# Patient Record
Sex: FEMALE | Race: WHITE | NOT HISPANIC OR LATINO | Employment: FULL TIME | URBAN - METROPOLITAN AREA
[De-identification: names, ages, dates, MRNs, and addresses within clinical notes are randomized per-mention and may not be internally consistent; named-entity substitution may affect disease eponyms.]

---

## 2024-07-17 ENCOUNTER — OFFICE VISIT (OUTPATIENT)
Dept: URGENT CARE | Facility: CLINIC | Age: 54
End: 2024-07-17
Payer: COMMERCIAL

## 2024-07-17 VITALS
DIASTOLIC BLOOD PRESSURE: 70 MMHG | HEART RATE: 80 BPM | HEIGHT: 65 IN | RESPIRATION RATE: 18 BRPM | BODY MASS INDEX: 35.32 KG/M2 | SYSTOLIC BLOOD PRESSURE: 108 MMHG | OXYGEN SATURATION: 96 % | TEMPERATURE: 99 F | WEIGHT: 212 LBS

## 2024-07-17 DIAGNOSIS — Z75.8 DOES NOT HAVE PRIMARY CARE PROVIDER: ICD-10-CM

## 2024-07-17 DIAGNOSIS — M79.674 PAIN OF GREAT TOE, RIGHT: Primary | ICD-10-CM

## 2024-07-17 PROCEDURE — 99213 OFFICE O/P EST LOW 20 MIN: CPT

## 2024-07-17 RX ORDER — LEVOTHYROXINE SODIUM 0.15 MG/1
150 TABLET ORAL DAILY
COMMUNITY
Start: 2024-05-20

## 2024-07-17 RX ORDER — NAPROXEN 500 MG/1
500 TABLET ORAL 2 TIMES DAILY WITH MEALS
Qty: 30 TABLET | Refills: 0 | Status: SHIPPED | OUTPATIENT
Start: 2024-07-17

## 2024-07-17 RX ORDER — DEXTROAMPHETAMINE SACCHARATE, AMPHETAMINE ASPARTATE, DEXTROAMPHETAMINE SULFATE AND AMPHETAMINE SULFATE 5; 5; 5; 5 MG/1; MG/1; MG/1; MG/1
20 TABLET ORAL 2 TIMES DAILY
COMMUNITY
Start: 2024-06-19 | End: 2024-07-19

## 2024-07-17 RX ORDER — SIMVASTATIN 20 MG
1 TABLET ORAL EVERY EVENING
COMMUNITY
Start: 2024-03-07

## 2024-07-17 RX ORDER — HALOBETASOL PROPIONATE 0.05 %
1 OINTMENT (GRAM) TOPICAL 2 TIMES DAILY
COMMUNITY
Start: 2024-05-01 | End: 2025-05-01

## 2024-07-17 RX ORDER — PROGESTERONE 200 MG/1
200 CAPSULE ORAL DAILY
COMMUNITY
Start: 2024-05-08 | End: 2025-05-08

## 2024-07-17 RX ORDER — LORAZEPAM 0.5 MG/1
0.5 TABLET ORAL
COMMUNITY
Start: 2024-06-19 | End: 2024-07-19

## 2024-07-17 RX ORDER — ESTRADIOL 0.05 MG/D
PATCH, EXTENDED RELEASE TRANSDERMAL
COMMUNITY
Start: 2024-04-16

## 2024-07-17 RX ORDER — VALACYCLOVIR HYDROCHLORIDE 500 MG/1
500 TABLET, FILM COATED ORAL 2 TIMES DAILY
COMMUNITY
Start: 2024-06-28

## 2024-07-17 NOTE — PATIENT INSTRUCTIONS
Use naproxen 500mg orally twice daily for 10-14 days.   Recommend low purine diet  Follow up with PCP in 3-5 days.  Proceed to ER if symptoms worse

## 2024-07-17 NOTE — PROGRESS NOTES
[facility]        NAME: Joselin Weiss is a 54 y.o. female  : 1970    MRN: [id number]  DATE: 2024  TIME: 9:32 PM    Assessment and Plan   Pain of great toe, right [M79.674]  1. Pain of great toe, right  naproxen (Naprosyn) 500 mg tablet      2. Does not have primary care provider  Ambulatory Referral to Family Practice            Patient Instructions     Use naproxen 500mg orally twice daily for 10-14 days.   Recommend low purine diet  Follow up with PCP in 3-5 days.  Proceed to  ER if symptoms worsen.    If tests are performed, our office will contact you with results only if changes need to made to the care plan discussed with you at the visit. [facility]    Chief Complaint     Chief Complaint   Patient presents with    Toe Pain     Reports on and off sharp pain on top of Right great toe, denies fever, denies known injury         History of Present Illness       Patient is a 55 yo female with no significant PMH presenting in the clinic today for right great toe pain x 2 weeks. Denies recent injuries, trauma, and/or falls. Patient locates their pain to the distal aspect of the right great toe. She describes her toe pain as intermittent sharp and stabbing pain. She states her pain is not exacerbated or alleviated by anything in particular. Denies fever, chills, numbness, tingling, swelling, bruising, erythema, warmth, chest pain, and SOB. Admits the use of ibuprofen for symptom management. Denies prior similar injuries. Denies h/o gout.          Review of Systems   Review of Systems   Constitutional:  Negative for chills and fever.   Respiratory:  Negative for shortness of breath.    Cardiovascular:  Negative for chest pain.   Musculoskeletal:  Positive for arthralgias. Negative for joint swelling.   Skin:  Negative for rash and wound.   Neurological:  Negative for numbness.         Current Medications       Current Outpatient  "Medications:     amphetamine-dextroamphetamine (ADDERALL) 20 mg tablet, Take 20 mg by mouth 2 (two) times a day, Disp: , Rfl:     estradiol (VIVELLE-DOT) 0.05 MG/24HR, PLACE 1 PATCH ON THE SKIN 2 TIMES A WEEK., Disp: , Rfl:     halobetasol (ULTRAVATE) 0.05 % ointment, Apply 1 application. topically 2 (two) times a day, Disp: , Rfl:     levothyroxine (Synthroid) 150 mcg tablet, Take 150 mcg by mouth daily, Disp: , Rfl:     LORazepam (ATIVAN) 0.5 mg tablet, Take 0.5 mg by mouth, Disp: , Rfl:     naproxen (Naprosyn) 500 mg tablet, Take 1 tablet (500 mg total) by mouth 2 (two) times a day with meals, Disp: 30 tablet, Rfl: 0    Progesterone 200 MG CAPS, Take 200 mg by mouth daily, Disp: , Rfl:     simvastatin (ZOCOR) 20 mg tablet, Take 1 tablet by mouth every evening, Disp: , Rfl:     valACYclovir (VALTREX) 500 mg tablet, Take 500 mg by mouth 2 (two) times a day, Disp: , Rfl:     Current Allergies     Allergies as of 2024 - never reviewed   Allergen Reaction Noted    Phentermine-topiramate Anxiety and Confusion 2022    Nitrous oxide Other (See Comments) 2021            The following portions of the patient's history were reviewed and updated as appropriate: allergies, current medications, past family history, past medical history, past social history, past surgical history and problem list.     Past Medical History:   Diagnosis Date    Hashimoto's disease     Hypothyroid        Past Surgical History:   Procedure Laterality Date     SECTION         No family history on file.      Medications have been verified.        Objective   /70 (BP Location: Right arm, Patient Position: Sitting)   Pulse 80   Temp 99 °F (37.2 °C) (Tympanic)   Resp 18   Ht 5' 5\" (1.651 m)   Wt 96.2 kg (212 lb)   LMP  (LMP Unknown) Comment: Curently on hormone replacement  SpO2 96%   BMI 35.28 kg/m²        Physical Exam     Physical Exam  Vitals reviewed.   Constitutional:       General: She is not in acute " distress.     Appearance: Normal appearance. She is normal weight. She is not ill-appearing.   HENT:      Head: Normocephalic.      Nose: Nose normal.      Mouth/Throat:      Mouth: Mucous membranes are moist.   Eyes:      Conjunctiva/sclera: Conjunctivae normal.   Cardiovascular:      Rate and Rhythm: Normal rate and regular rhythm.      Pulses: Normal pulses.      Heart sounds: Normal heart sounds. No murmur heard.     No friction rub. No gallop.   Pulmonary:      Effort: Pulmonary effort is normal.      Breath sounds: Normal breath sounds. No wheezing, rhonchi or rales.   Musculoskeletal:      Cervical back: Normal range of motion and neck supple.      Right ankle: Normal.      Left ankle: Normal.      Right foot: Normal range of motion. No swelling, deformity, tenderness or bony tenderness. Normal pulse.      Left foot: Normal.   Skin:     General: Skin is warm.      Findings: No rash.   Neurological:      Mental Status: She is alert.   Psychiatric:         Mood and Affect: Mood normal.         Behavior: Behavior normal.

## 2024-09-04 ENCOUNTER — TELEPHONE (OUTPATIENT)
Age: 54
End: 2024-09-04

## 2024-09-04 NOTE — TELEPHONE ENCOUNTER
Phone call from patient requesting office order labs prior to New Patient consult on 9/6/24 - advised patient labs are needed prior to appt, and will have to be ordered by PCP.  Patient to contact PCP - provided patient with lab close to home, and hours.

## 2024-09-05 ENCOUNTER — APPOINTMENT (OUTPATIENT)
Dept: LAB | Facility: HOSPITAL | Age: 54
End: 2024-09-05
Payer: COMMERCIAL

## 2024-09-05 DIAGNOSIS — E66.1 MORBID DRUG-INDUCED OBESITY: ICD-10-CM

## 2024-09-05 DIAGNOSIS — N95.9 MENOPAUSAL PROBLEM: ICD-10-CM

## 2024-09-05 DIAGNOSIS — N93.9 HEMORRHAGE IN UTERUS: ICD-10-CM

## 2024-09-05 DIAGNOSIS — E03.9 ACQUIRED HYPOTHYROIDISM: ICD-10-CM

## 2024-09-05 LAB
ALBUMIN SERPL BCG-MCNC: 4.5 G/DL (ref 3.5–5)
ALP SERPL-CCNC: 54 U/L (ref 34–104)
ALT SERPL W P-5'-P-CCNC: 28 U/L (ref 7–52)
ANION GAP SERPL CALCULATED.3IONS-SCNC: 7 MMOL/L (ref 4–13)
AST SERPL W P-5'-P-CCNC: 57 U/L (ref 13–39)
BILIRUB SERPL-MCNC: 0.48 MG/DL (ref 0.2–1)
BUN SERPL-MCNC: 25 MG/DL (ref 5–25)
CALCIUM SERPL-MCNC: 9.4 MG/DL (ref 8.4–10.2)
CHLORIDE SERPL-SCNC: 101 MMOL/L (ref 96–108)
CO2 SERPL-SCNC: 32 MMOL/L (ref 21–32)
CREAT SERPL-MCNC: 0.88 MG/DL (ref 0.6–1.3)
EST. AVERAGE GLUCOSE BLD GHB EST-MCNC: 117 MG/DL
ESTRADIOL SERPL-MCNC: 26.2 PG/ML
FSH SERPL-ACNC: 29.2 MIU/ML
GFR SERPL CREATININE-BSD FRML MDRD: 75 ML/MIN/1.73SQ M
GLUCOSE P FAST SERPL-MCNC: 97 MG/DL (ref 65–99)
HBA1C MFR BLD: 5.7 %
LH SERPL-ACNC: 13.2 MIU/ML
POTASSIUM SERPL-SCNC: 4.6 MMOL/L (ref 3.5–5.3)
PROGEST SERPL-MCNC: 21.7 NG/ML
PROT SERPL-MCNC: 7.5 G/DL (ref 6.4–8.4)
SODIUM SERPL-SCNC: 140 MMOL/L (ref 135–147)
T4 FREE SERPL-MCNC: 1 NG/DL (ref 0.61–1.12)
TESTOST SERPL-MSCNC: 77 NG/DL
TSH SERPL DL<=0.05 MIU/L-ACNC: 11.3 UIU/ML (ref 0.45–4.5)

## 2024-09-05 PROCEDURE — 83002 ASSAY OF GONADOTROPIN (LH): CPT

## 2024-09-05 PROCEDURE — 84403 ASSAY OF TOTAL TESTOSTERONE: CPT

## 2024-09-05 PROCEDURE — 36415 COLL VENOUS BLD VENIPUNCTURE: CPT

## 2024-09-05 PROCEDURE — 82672 ASSAY OF ESTROGEN: CPT

## 2024-09-05 PROCEDURE — 83001 ASSAY OF GONADOTROPIN (FSH): CPT

## 2024-09-05 PROCEDURE — 84443 ASSAY THYROID STIM HORMONE: CPT

## 2024-09-05 PROCEDURE — 83036 HEMOGLOBIN GLYCOSYLATED A1C: CPT

## 2024-09-05 PROCEDURE — 84439 ASSAY OF FREE THYROXINE: CPT

## 2024-09-05 PROCEDURE — 84144 ASSAY OF PROGESTERONE: CPT

## 2024-09-05 PROCEDURE — 82670 ASSAY OF TOTAL ESTRADIOL: CPT

## 2024-09-05 PROCEDURE — 80053 COMPREHEN METABOLIC PANEL: CPT

## 2024-09-06 ENCOUNTER — OFFICE VISIT (OUTPATIENT)
Dept: ENDOCRINOLOGY | Facility: HOSPITAL | Age: 54
End: 2024-09-06
Payer: COMMERCIAL

## 2024-09-06 VITALS
BODY MASS INDEX: 35.62 KG/M2 | HEIGHT: 65 IN | WEIGHT: 213.8 LBS | SYSTOLIC BLOOD PRESSURE: 118 MMHG | OXYGEN SATURATION: 97 % | HEART RATE: 83 BPM | DIASTOLIC BLOOD PRESSURE: 84 MMHG

## 2024-09-06 DIAGNOSIS — E66.9 CLASS 2 OBESITY WITHOUT SERIOUS COMORBIDITY WITH BODY MASS INDEX (BMI) OF 35.0 TO 35.9 IN ADULT, UNSPECIFIED OBESITY TYPE: ICD-10-CM

## 2024-09-06 DIAGNOSIS — E03.9 ACQUIRED HYPOTHYROIDISM: Primary | ICD-10-CM

## 2024-09-06 DIAGNOSIS — R73.03 PREDIABETES: ICD-10-CM

## 2024-09-06 PROBLEM — E66.812 CLASS 2 OBESITY WITH BODY MASS INDEX (BMI) OF 35.0 TO 35.9 IN ADULT: Status: ACTIVE | Noted: 2024-09-06

## 2024-09-06 PROCEDURE — 99204 OFFICE O/P NEW MOD 45 MIN: CPT | Performed by: INTERNAL MEDICINE

## 2024-09-06 RX ORDER — CALCIUM CITRATE/VITAMIN D3 200MG-6.25
TABLET ORAL
COMMUNITY

## 2024-09-06 RX ORDER — MULTIVITAMIN
1 CAPSULE ORAL DAILY
COMMUNITY
End: 2024-09-06 | Stop reason: ALTCHOICE

## 2024-09-06 RX ORDER — ESTRADIOL 0.07 MG/D
FILM, EXTENDED RELEASE TRANSDERMAL
COMMUNITY

## 2024-09-06 RX ORDER — LEVOTHYROXINE SODIUM 175 MCG
175 TABLET ORAL DAILY
Qty: 90 TABLET | Refills: 1 | Status: SHIPPED | OUTPATIENT
Start: 2024-09-06

## 2024-09-06 NOTE — PROGRESS NOTES
9/7/2024    Assessment & Plan      Diagnoses and all orders for this visit:    Acquired hypothyroidism  -     Testosterone, free, total  -     DHEA-sulfate  -     Insulin, fasting  -     Glucose, fasting  -     Thyroid Peroxidase and Thyroglobulin Antibodies  -     SALIVARY CORTISOL,THREE SPECIMENS; Future  -     Synthroid 175 MCG tablet; Take 1 tablet (175 mcg total) by mouth daily    Class 2 obesity without serious comorbidity with body mass index (BMI) of 35.0 to 35.9 in adult, unspecified obesity type  -     Testosterone, free, total  -     DHEA-sulfate  -     Insulin, fasting  -     Glucose, fasting  -     Thyroid Peroxidase and Thyroglobulin Antibodies  -     SALIVARY CORTISOL,THREE SPECIMENS; Future    Prediabetes  -     Testosterone, free, total  -     DHEA-sulfate  -     Insulin, fasting  -     Glucose, fasting  -     Thyroid Peroxidase and Thyroglobulin Antibodies  -     SALIVARY CORTISOL,THREE SPECIMENS; Future    Other orders  -     estradiol (VIVELLE-DOT) 0.075 MG/24HR; PLACE 1 PATCH ON THE SKIN 2 TIMES A WEEK.  -     Discontinue: Multiple Vitamin (multivitamin) capsule; Take 1 capsule by mouth daily  -     Multiple Vitamins-Minerals (Multivitamin Gummies Womens) CHEW; Chew 3 daily  -     MISC NATURAL PRODUCTS PO; Take by mouth Isagenix isaflush( for consipation) prn  -     COLLAGEN PO; Take by mouth Liquid prn        Assessment & Plan  1. Hypothyroidism.  Most recent thyroid function studies show that her TSH is markedly elevated with a normal free T4, signifying biochemical hypothyroidism. The use of estrogen replacement may have increased the liver metabolism of thyroid hormone, causing her to need increased thyroid hormone requirements. Proper administration of Synthroid was reviewed, and she will ensure a gap of 3 to 4 hours between her thyroid medicine and any supplements or vitamins. Synthroid will be increased to 175 mcg daily.     2. Prediabetes.  Her hemoglobin A1c of 5.7% is barely in the  prediabetes range. She is reassured about this and advised to continue working on dietary changes and exercise.    3. Obesity.  She has obesity, and secondary causes including elevated cortisol and insulin resistance will be ruled out. Thyroid antibodies will be checked to see if there is any underlying autoimmune disease. She will do midnight late-night saliva cortisol levels.     Fasting blood work will be drawn between 7 and 9 AM, consisting of glucose with fasting insulin level, DHEA-S, free and total testosterone level, thyroid peroxidase antibodies, and antithyroglobulin antibodies. She will also do three late-night saliva cortisol levels.    Follow-up will be determined based on the studies.      I have spent a total time of 60 minutes in caring for this patient on the day of the visit/encounter including Diagnostic results, Prognosis, Risks and benefits of tx options, Instructions for management, Patient and family education, Importance of tx compliance, Risk factor reductions, Impressions, Counseling / Coordination of care, Documenting in the medical record, Reviewing / ordering tests, medicine, procedures  , and Obtaining or reviewing history  .      CC: Thyroid consult    History of Present Illness    HPI: Joselin Weiss  is a 54-year-old female here for evaluation and consultation regarding her hypothyroidism.    She was diagnosed with hypothyroidism 21 years ago, following the birth of her first son. At that time, she also had high cholesterol, which was unusual for her. She has been on thyroid medication since then, with the dosage being adjusted over the years. Currently, she is taking Synthroid 150 mcg once daily in the morning, along with an over-the-counter multivitamin. She reports experiencing brain fog when her thyroid levels are unstable. She does not report any breast discharge or hot flashes. She has been dealing with constipation since her thyroid issues began and takes Isagenix to  manage it. She occasionally experiences heart palpitations, which have improved with hormone replacement therapy. She does not report any tremors or excessive fatigue but does report dry skin, particularly on her feet, and hair loss. She does not report any double vision or difficulty swallowing. She has not undergone radiation treatment to her head or neck.    She gained significant weight during her pregnancies, reaching a peak weight of 238 pounds. She managed to lose some weight, reaching a low of 190 pounds at her wedding, but has since regained some weight. She maintains a diet high in protein and low in carbohydrates and exercises 3-4 times a week. She has tried various weight management drugs, including Qsymia, phentermine, Topamax, and semaglutide, but found them either ineffective or associated with side effects such as anxiety and confusion.  Semaglutide was too expensive to continue and she only managed to lose 12 pounds.    She has been experiencing irregular menstrual cycles for the past four years, with periods of prolonged bleeding followed by periods of amenorrhea. She has been on hormone replacement therapy for over a year, which initially helped regulate her cycles, but she is now experiencing prolonged bleeding again. She also reports severe anxiety, panic attacks, and brain fog, which she attributes to her thyroid condition. She lost 40 pounds three years ago, but has since regained some weight. She does not report any fertility issues or use of oral contraceptives. She has been diagnosed with ovarian cysts. She tries to stay hydrated and urinates frequently. She does not report any numbness or tingling but does report occasional blurry vision, which she attributes to her eyes.    She has been diagnosed with prediabetes, which was confirmed by recent blood work. She has previously been diagnosed with prediabetes, but it resolved on its own.    She has been on Adderall for ADHD for about 5 to 6  years. She got  4 years ago, which worsened her condition. She started taking lorazepam after her brother passed away 4 years ago. She did not take it for a week and experienced memory issues. She feels depression coming on, but attributes it more to menopause. She feels like she is a mess and is more emotional.    FAMILY HISTORY  She denies any family history of thyroid disease or diabetes.        Historical Information   Past Medical History:   Diagnosis Date    ADHD     Anxiety     Hashimoto's disease     Hyperlipidemia     Hypothyroid     Prediabetes      Past Surgical History:   Procedure Laterality Date    ABDOMINOPLASTY       SECTION      X 2    WISDOM TOOTH EXTRACTION       Social History   Social History     Substance and Sexual Activity   Alcohol Use Yes    Alcohol/week: 4.0 standard drinks of alcohol    Types: 4 Glasses of wine per week    Comment: social     Social History     Substance and Sexual Activity   Drug Use Never     Social History     Tobacco Use   Smoking Status Never    Passive exposure: Never   Smokeless Tobacco Never     Family History:   Family History   Problem Relation Age of Onset    Seizures Mother     Dementia Mother     Depression Brother     Substance Abuse Brother     No Known Problems Son     No Known Problems Son        Meds/Allergies   Current Outpatient Medications   Medication Sig Dispense Refill    COLLAGEN PO Take by mouth Liquid prn      estradiol (VIVELLE-DOT) 0.075 MG/24HR PLACE 1 PATCH ON THE SKIN 2 TIMES A WEEK.      halobetasol (ULTRAVATE) 0.05 % ointment Apply 1 application. topically 2 (two) times a day      LORazepam (ATIVAN) 0.5 mg tablet Take 0.5 mg by mouth 1 in am prn, rarely 2nd in a day      MISC NATURAL PRODUCTS PO Take by mouth Isagenix isaflush( for consipation) prn      Multiple Vitamins-Minerals (Multivitamin Gummies Womens) CHEW Chew 3 daily      Progesterone 200 MG CAPS Take 200 mg by mouth daily      simvastatin (ZOCOR) 20 mg tablet  "Take 1 tablet by mouth every evening      Synthroid 175 MCG tablet Take 1 tablet (175 mcg total) by mouth daily 90 tablet 1    amphetamine-dextroamphetamine (ADDERALL) 20 mg tablet Take 20 mg by mouth 2 (two) times a day       No current facility-administered medications for this visit.     Allergies   Allergen Reactions    Phentermine-Topiramate Anxiety and Confusion    Nitrous Oxide Other (See Comments)     Post dental office, hallucinated, \"a bad trip,\" a couple weeks for each episode       Objective   Vitals: Blood pressure 118/84, pulse 83, height 5' 5\" (1.651 m), weight 97 kg (213 lb 12.8 oz), SpO2 97%.  Invasive Devices       None                   Physical Exam    HEENT: No lid lag, stare, proptosis, or periorbital edema.  No moon facies.  Neck: Thyroid normal in size.  Thyroid full.  No palpable thyroid nodules.  No lymphadenopathy.  No supraclavicular fat pad or buffalo hump.  Lungs: Clear to auscultation.  Coronary: Regular rate and rhythm.  No murmurs.  Extremities: No lower extremity edema.  No tremor of the outstretched hands.  No CVA tenderness.  No spinous process tenderness.  Neuro: Alert and oriented x 3.  Patellar deep tendon reflexes normal.  Skin: Warm and dry.  No violaceous striae.  No hirsutism on the chin, mid chest, or mid abdomen.      The history was obtained from the review of the chart and from the patient.    Lab Results:      Blood work performed on 9/5/2024 showed a TSH of 11.304 with a free T4 of 1.    Estradiol is 26.2 with an LH of 13.2 and an FSH of 29.2.  Progesterone is 21.7.    CMP showed a glucose of 97 fasting, sodium 140 with a potassium of 4.6 and an AST of 57 but was otherwise normal.    Hemoglobin A1c is 5.7%.    Total testosterone is 77 but no free testosterone was performed.        Lab Results   Component Value Date    CREATININE 0.88 09/05/2024    BUN 25 09/05/2024    K 4.6 09/05/2024     09/05/2024    CO2 32 09/05/2024     eGFR   Date Value Ref Range Status "   09/05/2024 75 ml/min/1.73sq m Final         Lab Results   Component Value Date    ALT 28 09/05/2024    AST 57 (H) 09/05/2024    ALKPHOS 54 09/05/2024       Lab Results   Component Value Date    FREET4 1.00 09/05/2024             Future Appointments   Date Time Provider Department Center   9/13/2024  1:30 PM SASHA Myers [facility]

## 2024-09-06 NOTE — PATIENT INSTRUCTIONS
The thyroid blood work is very underactive.    Let's try increasing the synthroid 175 mcg daily.     Make sure to move any vitamins or supplements to at least 3-4 hours away from when you take the synthroid.     I want to do fasting blood work.     We'll do 3 lat night before bedtime saliva cortisol levels.     Follow up to be determined.

## 2024-09-07 LAB — ESTROGEN SERPL-MCNC: 149 PG/ML

## 2024-09-09 ENCOUNTER — APPOINTMENT (OUTPATIENT)
Dept: LAB | Facility: HOSPITAL | Age: 54
End: 2024-09-09
Payer: COMMERCIAL

## 2024-09-09 ENCOUNTER — TELEPHONE (OUTPATIENT)
Dept: ENDOCRINOLOGY | Facility: HOSPITAL | Age: 54
End: 2024-09-09

## 2024-09-09 DIAGNOSIS — E66.9 CLASS 2 OBESITY WITHOUT SERIOUS COMORBIDITY WITH BODY MASS INDEX (BMI) OF 35.0 TO 35.9 IN ADULT, UNSPECIFIED OBESITY TYPE: ICD-10-CM

## 2024-09-09 DIAGNOSIS — E03.9 ACQUIRED HYPOTHYROIDISM: Primary | ICD-10-CM

## 2024-09-09 DIAGNOSIS — R73.03 PREDIABETES: ICD-10-CM

## 2024-09-09 LAB
GLUCOSE P FAST SERPL-MCNC: 99 MG/DL (ref 65–99)
INSULIN SERPL-ACNC: 7.17 UIU/ML (ref 1.9–23)

## 2024-09-09 PROCEDURE — 86376 MICROSOMAL ANTIBODY EACH: CPT

## 2024-09-09 PROCEDURE — 82627 DEHYDROEPIANDROSTERONE: CPT | Performed by: INTERNAL MEDICINE

## 2024-09-09 PROCEDURE — 82947 ASSAY GLUCOSE BLOOD QUANT: CPT | Performed by: INTERNAL MEDICINE

## 2024-09-09 PROCEDURE — 86800 THYROGLOBULIN ANTIBODY: CPT

## 2024-09-09 PROCEDURE — 84402 ASSAY OF FREE TESTOSTERONE: CPT | Performed by: INTERNAL MEDICINE

## 2024-09-09 PROCEDURE — 84403 ASSAY OF TOTAL TESTOSTERONE: CPT | Performed by: INTERNAL MEDICINE

## 2024-09-09 PROCEDURE — 36415 COLL VENOUS BLD VENIPUNCTURE: CPT | Performed by: INTERNAL MEDICINE

## 2024-09-09 PROCEDURE — 83525 ASSAY OF INSULIN: CPT | Performed by: INTERNAL MEDICINE

## 2024-09-09 NOTE — TELEPHONE ENCOUNTER
Patient said that she did get the lab slip for the cortisone test that she will do for the next two nights.    As far as the Synthroid, it needs a Prior Auth and needs verification codes to approve the medication.    The dosages and the amount dispensed has changed but the patient said to do what ever needs to be done to get approved. 30 or 90 day supply she doesn't care.    She also wanted to talk to the provider because her TSH levels were elevated and the last 3 weeks her mood and depression have been extremely severe. She doesn't recognize herself, her thoughts were very dark, and said it was just very bad. Today she is feeling a little better but she wants to speak with the provider to see what to do to help her.    Patient would like a call back as soon as possible.

## 2024-09-09 NOTE — TELEPHONE ENCOUNTER
I called and left a message concerning a patient message from her earlier on 9/7/2024.  The provider wanted me to ask to see if she got the lab slip for the saliva cortisol testing and also what needed to be done concerning her thyroid medication.

## 2024-09-10 ENCOUNTER — PATIENT MESSAGE (OUTPATIENT)
Dept: ENDOCRINOLOGY | Facility: HOSPITAL | Age: 54
End: 2024-09-10

## 2024-09-10 LAB
DHEA-S SERPL-MCNC: 112 UG/DL (ref 41.2–243.7)
TESTOST FREE SERPL-MCNC: 4.8 PG/ML (ref 0–4.2)
TESTOST SERPL-MCNC: 23 NG/DL (ref 4–50)
THYROGLOB AB SERPL-ACNC: <1 IU/ML (ref 0–0.9)
THYROPEROXIDASE AB SERPL-ACNC: 491 IU/ML (ref 0–34)

## 2024-09-10 NOTE — PATIENT COMMUNICATION
Patient called- she didn't hear back from Dr. Griffin regarding her labs. She said this is urgent and she would like a call back today because of how high her antibody level was.    Her call back number is [phone].

## 2024-09-11 ENCOUNTER — TELEPHONE (OUTPATIENT)
Age: 54
End: 2024-09-11

## 2024-09-11 NOTE — PATIENT COMMUNICATION
Patient called again about this, asks if provider can please review results.  She reports she feels more depressed and having brain fog.  Please review and call patient.    In regards to her Synthroid issues, she is not sure why the pharmacy is denying it.  Offered to call pharmacy for her.  Patient agreeable and would like a call back.  Called [facility], spoke with pharmacy staff.  Synthroid needs prior auth, will send message to PA team. Informed patient.

## 2024-09-11 NOTE — LETTER
ATTN: APPEALS DEPARTMENT     Patient: Joselin Weiss :1970 Member ID:[id number]    Re: Request for Reconsideration of SYNTHROID  175 MCG     To Whom It May Concern:   Joselin Weiss 1970 was denied coverage of SYNTHROID on 2024. The denial reason was stated as not covered due to PATIENT HAVING TO TRY LEVOTHYROXINE. I am requesting a redetermination of the denial of coverage for SYNTHROID due to PATIENT HAS TRIED AND COULD NOT BECOME THERAPEUTIC WITH THE LEVOTHYROXINE. PATIENT HAS BEEN GETTING THE MEDICATION EVERY MONTH AS IT IS.      In conclusion, please reconsider the denial of SYNTHROID for my patient. I would appreciate prompt review of this appeal and approval of this FDA-approved medication. I can be reached by phone at [phone]  or via fax at [phone] for additional information and discussion. Thank you.      Sincerely,   Janell Griffin MD

## 2024-09-13 ENCOUNTER — OFFICE VISIT (OUTPATIENT)
Dept: GYNECOLOGY | Facility: CLINIC | Age: 54
End: 2024-09-13
Payer: COMMERCIAL

## 2024-09-13 ENCOUNTER — APPOINTMENT (OUTPATIENT)
Dept: LAB | Facility: HOSPITAL | Age: 54
End: 2024-09-13
Payer: COMMERCIAL

## 2024-09-13 VITALS
BODY MASS INDEX: 35.32 KG/M2 | DIASTOLIC BLOOD PRESSURE: 86 MMHG | SYSTOLIC BLOOD PRESSURE: 138 MMHG | WEIGHT: 212 LBS | HEART RATE: 79 BPM | HEIGHT: 65 IN

## 2024-09-13 DIAGNOSIS — Z78.0 MENOPAUSE: Primary | ICD-10-CM

## 2024-09-13 DIAGNOSIS — R73.03 PREDIABETES: ICD-10-CM

## 2024-09-13 DIAGNOSIS — E66.9 CLASS 2 OBESITY WITHOUT SERIOUS COMORBIDITY WITH BODY MASS INDEX (BMI) OF 35.0 TO 35.9 IN ADULT, UNSPECIFIED OBESITY TYPE: ICD-10-CM

## 2024-09-13 DIAGNOSIS — E03.9 ACQUIRED HYPOTHYROIDISM: ICD-10-CM

## 2024-09-13 PROCEDURE — 82533 TOTAL CORTISOL: CPT

## 2024-09-13 PROCEDURE — 99204 OFFICE O/P NEW MOD 45 MIN: CPT | Performed by: OBSTETRICS & GYNECOLOGY

## 2024-09-13 NOTE — PROGRESS NOTES
Assessment/Plan:    Discussed with pt that it is my feeling that she still has ovarian function resulting in irregular bleeding.  I would like pt to stop hormone, ideally for 1 month, but at least 2 weeks and then recheck FSH/LH/estradiol. She has been advised to do this in the past by gyn, she stopped for 2 weeks but went back on because she felt awful. No labs drawn at that time. Lab script given and pt advised to notify the office if/when she stops hormone and has lab work drawn.   Discussed menopausal and premenopausal hormonal doses.   Also discussed thyroid impact on bleeding.     I have spent a total time of 47 minutes in caring for this patient on the day of the visit/encounter including Diagnostic results, Prognosis, Risks and benefits of tx options, Instructions for management, Patient and family education, Importance of tx compliance, Risk factor reductions, Impressions, Counseling / Coordination of care, Documenting in the medical record, Reviewing / ordering tests, medicine, procedures  , and Obtaining or reviewing history  .      Diagnoses and all orders for this visit:    Menopause  -     Luteinizing hormone; Future  -     Follicle stimulating hormone; Future  -     Estradiol; Future        Subjective:      Patient ID: Joselin Weiss is a 54 y.o. female.    New pt presents for menopause consult.  Pt states she is concerned with continued bleeding every week while on vivelle dot 0.075 mg and oral progesterone 200 mg daily. Denies missed progesterone dose. Pt started hormone 8/2023 and it is unclear whether she went 12 months without a period before starting.   FSH done in 2022 showed continued ovarian function. FSH/LH/estradiol done since then have been while pt was on hormone.   She had a D&C/hysteroscopy 2/2024 with benign endometrium.   She states she's had normal vaginal exams.   She has been advised to come off of hormone by gyn. Pt did go off for 2 weeks but restarted because she felt  "terrible. No blood work done at that time.   She is also working with endocrinology to manage thyroid fluctuations. She is awaiting increased synthroid dose of 175 mcgs to be approved by insurance. TSH on 9/5/24 11.3.        The following portions of the patient's history were reviewed and updated as appropriate: allergies, current medications, past family history, past medical history, past social history, past surgical history and problem list.    Review of Systems   Constitutional: Negative.    Respiratory: Negative.     Cardiovascular: Negative.    Gastrointestinal: Negative.    Endocrine: Negative.    Genitourinary:  Positive for vaginal bleeding. Negative for dyspareunia, dysuria, frequency, pelvic pain, urgency, vaginal discharge and vaginal pain.   Musculoskeletal: Negative.    Skin: Negative.    Neurological: Negative.    Psychiatric/Behavioral: Negative.           Objective:      /86   Pulse 79   Ht 5' 5\" (1.651 m)   Wt 96.2 kg (212 lb)   BMI 35.28 kg/m²          Physical Exam  Vitals and nursing note reviewed.   Constitutional:       Appearance: Normal appearance.   HENT:      Head: Normocephalic and atraumatic.   Pulmonary:      Effort: Pulmonary effort is normal.   Musculoskeletal:         General: Normal range of motion.      Cervical back: Normal range of motion.   Skin:     General: Skin is warm and dry.   Neurological:      Mental Status: She is alert and oriented to person, place, and time.   Psychiatric:         Mood and Affect: Mood normal.         Behavior: Behavior normal.         Thought Content: Thought content normal.         Judgment: Judgment normal.         "

## 2024-09-17 NOTE — TELEPHONE ENCOUNTER
PA for Synthroid 175 MCG tablet SUBMITTED     via    []CMM-KEY:    [x]Jessica-Case ID # [id number]  []Faxed to plan   []Other website    []Phone call Case ID #      Office notes sent, clinical questions answered. Awaiting determination    Turnaround time for your insurance to make a decision on your Prior Authorization can take 7-21 business days.

## 2024-09-18 NOTE — TELEPHONE ENCOUNTER
PA for Synthroid 175 MCG tablet  DENIED    Reason:      Message sent to office clinical pool No      Denial letter scanned into Media Yes    Appeal started Yes (Provider will need to decide if appeal is warranted and send clinical documentation to Prior Authorization Team for initiation.)    **Please follow up with your patient regarding denial and next steps**

## 2024-09-18 NOTE — TELEPHONE ENCOUNTER
PA for SYNTHROID 175 MCG APPEALED via     []CMM  []SS  [x]Letter sent to insurance via fax [phone]  []Other site or means      All necessary records sent. Will await response from insurance company    Turnaround time for a decision to be made on an appeal could take up to 30 business days

## 2024-09-19 LAB
CORTIS BS SAL-MCNC: <0.01 UG/DL
CORTIS SP1 P CHAL SAL-MCNC: 0.02 UG/DL
CORTIS SP2 P CHAL SAL-MCNC: 0.31 UG/DL

## 2025-01-13 ENCOUNTER — OFFICE VISIT (OUTPATIENT)
Dept: URGENT CARE | Facility: CLINIC | Age: 55
End: 2025-01-13
Payer: COMMERCIAL

## 2025-01-13 VITALS
TEMPERATURE: 99.5 F | DIASTOLIC BLOOD PRESSURE: 72 MMHG | SYSTOLIC BLOOD PRESSURE: 136 MMHG | OXYGEN SATURATION: 99 % | HEART RATE: 96 BPM | RESPIRATION RATE: 16 BRPM

## 2025-01-13 DIAGNOSIS — J02.9 ACUTE PHARYNGITIS, UNSPECIFIED ETIOLOGY: ICD-10-CM

## 2025-01-13 DIAGNOSIS — R52 BODY ACHES: ICD-10-CM

## 2025-01-13 DIAGNOSIS — J06.9 VIRAL URI WITH COUGH: Primary | ICD-10-CM

## 2025-01-13 PROCEDURE — 99213 OFFICE O/P EST LOW 20 MIN: CPT

## 2025-01-13 PROCEDURE — 87636 SARSCOV2 & INF A&B AMP PRB: CPT

## 2025-01-13 PROCEDURE — 87880 STREP A ASSAY W/OPTIC: CPT

## 2025-01-13 RX ORDER — METHYLPREDNISOLONE 4 MG/1
TABLET ORAL
Qty: 21 TABLET | Refills: 0 | Status: SHIPPED | OUTPATIENT
Start: 2025-01-13

## 2025-01-14 LAB
FLUAV RNA RESP QL NAA+PROBE: NEGATIVE
FLUBV RNA RESP QL NAA+PROBE: NEGATIVE
SARS-COV-2 RNA RESP QL NAA+PROBE: POSITIVE

## 2025-01-14 NOTE — PATIENT INSTRUCTIONS
Rapid strep test negative.  You have a Covid/Flu PCR test pending. You can download [employer] for the results which take approximately 24-48 hours. You will be notified if positive.      Your symptoms are consistent with a viral illness.    For nasal/sinus congestion you can try steam, warm compresses, saline nasal spray, Neti pot, nasal steroid (Flonase, Nasocort), or nasal decongestant (Afrin - for 3 days only).    You can try a decongestant (Sudafed) if > 6 years of age and no history of high blood pressure.    For cough you can take an over-the-counter expectorant such as plain Robitussion or Mucinex. A spoonful of honey at bedtime may also be helpful.    For cold symptoms with high blood pressure take Coricidin cough/cold.     For sore throat you can use Cepacol lozenges, do warm salt water gargles, drink warm water with lemon or herbal teas, or use an over-the-counter throat spray (Chloraseptic).    You can take ibuprofen/Motrin and acetaminophen/Tylenol as needed for pain, fever, body aches. Do not take ibuprofen/Motrin/Advil if you have a history of heart disease, bleeding ulcers, or if you take blood thinners.     Drink plenty of fluids to stay hydrated. Airborne or Emergen-C for extra vitamin C and zinc.    Follow up with your PCP in 3-5 days for persistent symptoms.    Go to the ER if symptoms worsen.

## 2025-01-14 NOTE — PROGRESS NOTES
[facility]        NAME: Joselin Weiss is a 54 y.o. female  : 1970    MRN: [id number]  DATE: 2025  TIME: 8:29 PM    Assessment and Plan   Viral URI with cough [J06.9]  1. Viral URI with cough  methylPREDNISolone 4 MG tablet therapy pack      2. Acute pharyngitis, unspecified etiology  POCT rapid ANTIGEN strepA      3. Body aches  Covid/Flu- Office Collect Normal            Patient Instructions     Rapid strep test negative.  You have a Covid/Flu PCR test pending. You can download [facility] MyChart for the results which take approximately 24-48 hours. You will be notified if positive.      Your symptoms are consistent with a viral illness.    For nasal/sinus congestion you can try steam, warm compresses, saline nasal spray, Neti pot, nasal steroid (Flonase, Nasocort), or nasal decongestant (Afrin - for 3 days only).    You can try a decongestant (Sudafed) if > 6 years of age and no history of high blood pressure.    For cough you can take an over-the-counter expectorant such as plain Robitussion or Mucinex. A spoonful of honey at bedtime may also be helpful.    For cold symptoms with high blood pressure take Coricidin cough/cold.     For sore throat you can use Cepacol lozenges, do warm salt water gargles, drink warm water with lemon or herbal teas, or use an over-the-counter throat spray (Chloraseptic).    You can take ibuprofen/Motrin and acetaminophen/Tylenol as needed for pain, fever, body aches. Do not take ibuprofen/Motrin/Advil if you have a history of heart disease, bleeding ulcers, or if you take blood thinners.     Drink plenty of fluids to stay hydrated. Airborne or Emergen-C for extra vitamin C and zinc.    Follow up with your PCP in 3-5 days for persistent symptoms.    Go to the ER if symptoms worsen.     If tests are performed, our office will contact you with results only if changes need to made to the care plan discussed with you at the visit. You can review your  full results on [facility].      Chief Complaint     Chief Complaint   Patient presents with    Nasal Congestion     Patient with sore throat, ear pain, cough, back pain, nausea, chills and fatigue x2 days. Patient states OTC meds have been minimally helpful.          History of Present Illness       54-year-old female presenting with cold-like symptoms x 2 days.  Patient reports fatigue, body aches, chills, ear discomfort, sore throat, congestion, cough, and nausea. No chest pain or shortness of breath. Taking day/night cold and flu medication.  Concerned as she is traveling out of the country [place] 1 week.        Review of Systems   Review of Systems   Constitutional:  Positive for chills and fatigue.   HENT:  Positive for congestion, ear pain and sore throat. Negative for ear discharge and sinus pressure.    Eyes:  Negative for discharge and redness.   Respiratory:  Positive for cough. Negative for shortness of breath and wheezing.    Cardiovascular:  Negative for chest pain and palpitations.   Gastrointestinal:  Positive for nausea. Negative for abdominal pain, diarrhea and vomiting.   Musculoskeletal:  Positive for back pain. Negative for joint swelling and neck pain.   Skin:  Negative for rash.   Neurological:  Negative for dizziness, light-headedness and headaches.         Current Medications       Current Outpatient Medications:     methylPREDNISolone 4 MG tablet therapy pack, Use as directed on package, Disp: 21 tablet, Rfl: 0    amphetamine-dextroamphetamine (ADDERALL) 20 mg tablet, Take 20 mg by mouth 2 (two) times a day, Disp: , Rfl:     COLLAGEN PO, Take by mouth Liquid prn, Disp: , Rfl:     estradiol (VIVELLE-DOT) 0.075 MG/24HR, PLACE 1 PATCH ON THE SKIN 2 TIMES A WEEK., Disp: , Rfl:     halobetasol (ULTRAVATE) 0.05 % ointment, Apply 1 application. topically 2 (two) times a day, Disp: , Rfl:     LORazepam (ATIVAN) 0.5 mg tablet, Take 0.5 mg by mouth 1 in am prn, rarely 2nd in a day, Disp: , Rfl:      MISC NATURAL PRODUCTS PO, Take by mouth Isagenix isaflush( for consipation) prn, Disp: , Rfl:     Multiple Vitamins-Minerals (Multivitamin Gummies Womens) CHEW, Chew 3 daily, Disp: , Rfl:     Progesterone 200 MG CAPS, Take 200 mg by mouth daily, Disp: , Rfl:     simvastatin (ZOCOR) 20 mg tablet, Take 1 tablet by mouth every evening, Disp: , Rfl:     Synthroid 175 MCG tablet, Take 1 tablet (175 mcg total) by mouth daily, Disp: 90 tablet, Rfl: 1    Current Allergies     Allergies as of 2025 - Reviewed 2025   Allergen Reaction Noted    Phentermine-topiramate Anxiety and Confusion 2022    Nitrous oxide Other (See Comments) 2021            The following portions of the patient's history were reviewed and updated as appropriate: allergies, current medications, past family history, past medical history, past social history, past surgical history and problem list.     Past Medical History:   Diagnosis Date    ADHD     Anxiety     Hashimoto's disease     Hyperlipidemia     Hypothyroid     Prediabetes        Past Surgical History:   Procedure Laterality Date    ABDOMINOPLASTY       SECTION      X 2    WISDOM TOOTH EXTRACTION         Family History   Problem Relation Age of Onset    Seizures Mother     Dementia Mother     Depression Brother     Substance Abuse Brother     No Known Problems Son     No Known Problems Son          Medications have been verified.        Objective   /72   Pulse 96   Temp 99.5 °F (37.5 °C)   Resp 16   SpO2 99%        Physical Exam     Physical Exam  Vitals and nursing note reviewed.   Constitutional:       General: She is not in acute distress.     Appearance: She is not ill-appearing or diaphoretic.   HENT:      Head: Normocephalic and atraumatic.      Right Ear: Tympanic membrane, ear canal and external ear normal.      Left Ear: Tympanic membrane, ear canal and external ear normal.      Nose: Nose normal.      Mouth/Throat:      Mouth: Mucous  membranes are moist.      Pharynx: Oropharynx is clear.   Eyes:      Conjunctiva/sclera: Conjunctivae normal.   Cardiovascular:      Rate and Rhythm: Normal rate and regular rhythm.   Pulmonary:      Effort: Pulmonary effort is normal.      Breath sounds: Normal breath sounds.   Musculoskeletal:         General: Normal range of motion.      Cervical back: Normal range of motion and neck supple.   Skin:     General: Skin is warm and dry.      Capillary Refill: Capillary refill takes less than 2 seconds.   Neurological:      Mental Status: She is alert and oriented to person, place, and time.

## 2025-04-04 ENCOUNTER — TELEPHONE (OUTPATIENT)
Dept: GYNECOLOGY | Facility: CLINIC | Age: 55
End: 2025-04-04

## 2025-04-04 DIAGNOSIS — Z78.0 MENOPAUSE: Primary | ICD-10-CM

## 2025-04-04 RX ORDER — PROGESTERONE 200 MG/1
200 CAPSULE ORAL DAILY
Qty: 30 CAPSULE | Refills: 0 | Status: SHIPPED | OUTPATIENT
Start: 2025-04-04 | End: 2026-04-04

## 2025-04-04 NOTE — TELEPHONE ENCOUNTER
----- Message from SASHA Myers sent at 4/4/2025  2:58 PM EDT -----  Regarding: needs menopause follow up visit  Pt never had 3 month menopause follow up visit. Please schedule. Needs to be in person.

## 2025-04-07 ENCOUNTER — TELEPHONE (OUTPATIENT)
Dept: GYNECOLOGY | Facility: CLINIC | Age: 55
End: 2025-04-07

## 2025-04-07 NOTE — TELEPHONE ENCOUNTER
Patient called requesting refill for Progesterone 200 MG CAPS . Patient made aware medication was refilled on 04/04/25 for 30 with 0 refills to [facility] pharmacy. Patient instructed to contact the pharmacy to obtain refills of medication. Patient verbalized understanding.

## 2025-05-03 DIAGNOSIS — Z78.0 MENOPAUSE: ICD-10-CM

## 2025-05-07 ENCOUNTER — TELEPHONE (OUTPATIENT)
Age: 55
End: 2025-05-07

## 2025-05-07 NOTE — TELEPHONE ENCOUNTER
I left pt another message to return call re: med refill. She was bleeding and was s/p D&C 2/2024. If she is still bleeding she will need to be seen in the office for tvs/sis/emb.   (It would not let me send it to [facility] [place], so I sent it to you two)

## 2025-05-07 NOTE — TELEPHONE ENCOUNTER
Spoke with pt to see if she is still bleeding on HRT medication. She was unclear with where she was at with her symptoms. Please call her when you are available. She is available to speak preferably in the morning

## 2025-05-12 DIAGNOSIS — Z78.0 MENOPAUSE: ICD-10-CM

## 2025-05-12 RX ORDER — PROGESTERONE 200 MG/1
200 CAPSULE ORAL DAILY
Qty: 30 CAPSULE | Refills: 0 | Status: SHIPPED | OUTPATIENT
Start: 2025-05-12 | End: 2026-05-12

## 2025-05-12 RX ORDER — ESTRADIOL 0.07 MG/D
1 FILM, EXTENDED RELEASE TRANSDERMAL 2 TIMES WEEKLY
Qty: 8 PATCH | Refills: 0 | Status: SHIPPED | OUTPATIENT
Start: 2025-05-12

## 2025-05-12 RX ORDER — PROGESTERONE 200 MG/1
1 CAPSULE ORAL DAILY
Qty: 30 CAPSULE | Refills: 0 | OUTPATIENT
Start: 2025-05-12

## 2025-05-12 NOTE — TELEPHONE ENCOUNTER
Pt scheduled. I let pt know if she is continuing to have bleeding prescription cannot be refilled at this time until she has necessary testing. Pt is scheduled for 5/28. She does not wish to be off HRT for the next two weeks. Pt wishes to speak with you regarding this

## 2025-05-12 NOTE — TELEPHONE ENCOUNTER
I left pt a message letting her know I sent in 1 month script for both estrogen patch and oral progesterone until her appt on 5/28/25. That will be the last script until bleeding is evaluated.

## 2025-05-21 NOTE — PROGRESS NOTES
AMB US Pelvic Non OB    Date/Time: 5/28/2025 2:30 PM    Performed by: Sherry Jimenez  Authorized by: Roe Garces DO    Universal Protocol:  Consent: Verbal consent obtained  Consent given by: patient  Timeout called at: 5/28/2025 2:36 PM.  Patient understanding: patient states understanding of the procedure being performed  Patient identity confirmed: verbally with patient    Procedure details:     SIS Procedure: Yes    Indications: non-obstetric vaginal bleeding      Technique:  Transvaginal US, Non-OB    Position: lithotomy exam    Uterine findings:     Length (cm): 7.64    Height (cm):  4.19    Width (cm):  6.05    Endometrial stripe: identified      Endometrium thickness (mm):  7.29  Left ovary findings:     Left ovary:  Visualized    Length (cm): 2.86    Height (cm): 1.49    Width (cm): 1.46  Right ovary findings:     Right ovary:  Visualized    Length (cm): 2.57    Height (cm): 1.38    Width (cm): 1.44  Other findings:     Free pelvic fluid: not identified      Free peritoneal fluid: not identified    Post-Procedure Details:     Impression:  Retroflexed uterus is inhomogeneous throughout without fibroids. The bilateral ovaries appear within normal limits. No free fluid.     Tolerance:  Tolerated well, no immediate complications    Complications: no complications    Additional Procedure Comments:      GE Voluson P8 transvaginal transducer RIC5-RA with Serial Number [id number] was used during procedure and subsequently cleaned with high level disinfection utilizing the [employer] EPR Probe .     Ultrasound performed at:     [facility]  [address] [place] [address]  Phone: [phone]  Fax:  [phone]      Sonohysterogram    Date/Time: 5/28/2025 2:30 PM    Performed by: Roe Garces DO  Authorized by: Roe Garces DO    Universal Protocol:  Consent: Verbal consent obtained  Consent given by: patient  Timeout called at:  5/28/2025 2:36 PM.  Patient understanding: patient states understanding of the procedure being performed  Patient consent: the patient's understanding of the procedure matches consent given  Patient identity confirmed: verbally with patient    Procedure:     Cervix cleaned and prepped: yes      Tenaculum applied to cervix: yes      Uterus sounded: yes      Catheter inserted: yes      Uterine cavity distended with saline: yes    Post-procedure:     Patient observed: yes      Post procedure instructions given to patient: yes      Patient tolerated procedure well with no complications: yes    Comments:      Sonohysterogram demonstrates a smooth appearing endometrium.   Endometrial biopsy    Date/Time: 5/28/2025 2:30 PM    Performed by: Roe Garces DO  Authorized by: Roe Garces DO    Universal Protocol:  Consent: Verbal consent obtained  Consent given by: patient  Timeout called at: 5/28/2025 2:36 PM.  Patient understanding: patient states understanding of the procedure being performed  Patient identity confirmed: verbally with patient    Indication:     Indications: Post-menopausal bleeding    Procedure:     Procedure: endometrial biopsy with Pipelle      A bivalve speculum was placed in the vagina: yes      Cervix cleaned and prepped: yes      Specimen collected: specimen collected and sent to pathology      Patient tolerated procedure well with no complications: yes

## 2025-05-28 ENCOUNTER — ULTRASOUND (OUTPATIENT)
Dept: GYNECOLOGY | Facility: CLINIC | Age: 55
End: 2025-05-28

## 2025-05-28 ENCOUNTER — OFFICE VISIT (OUTPATIENT)
Dept: GYNECOLOGY | Facility: CLINIC | Age: 55
End: 2025-05-28

## 2025-05-28 DIAGNOSIS — N95.0 PMB (POSTMENOPAUSAL BLEEDING): Primary | ICD-10-CM

## 2025-05-28 DIAGNOSIS — Z79.890 HORMONE REPLACEMENT THERAPY (HRT): ICD-10-CM

## 2025-05-28 DIAGNOSIS — E03.9 HYPOTHYROIDISM, UNSPECIFIED TYPE: ICD-10-CM

## 2025-05-28 PROCEDURE — 88305 TISSUE EXAM BY PATHOLOGIST: CPT | Performed by: STUDENT IN AN ORGANIZED HEALTH CARE EDUCATION/TRAINING PROGRAM

## 2025-05-28 NOTE — PROGRESS NOTES
:  Assessment & Plan  PMB (postmenopausal bleeding)  Viewed ultrasound findings with patient with biopsy results pending.  Scant tissue obtained on biopsy.  My recommendation was that she needs to get her thyroid levels appropriate and following that should she continue have irregular bleeding I would modify the dosage of HRT.       Hormone replacement therapy (HRT)         Hypothyroidism, unspecified type             History of Present Illness     Joselin Weiss is a 55 y.o. female   HPI patient last seen in the office in September 2020 for with [place] is a new patient for menopause consult.  Patient stated she is concerned with continued bleeding every week while on Vivelle-Dot 0.075 mg and oral progesterone 200 mg daily.  She denies missing any progesterone dosage.  Patient started on hormone replacement therapy in August 2023.  The patient has had a prior D&C hysteroscopy in February 2024 at [facility] with findings of atrophic endometrium with a benign endometrium.  She is also on thyroid supplement.  There is been in some issues with changing dosage patient has not had a follow-up TSH and she has not been on the dosage recommended.  She has been having some irregular bleeding and advised to return to the office for TVS possible SIS possible biopsy      Review of Systems  Objective   There were no vitals taken for this visit.     Physical Exam  Procedure Orders   AMB US Pelvic Non OB [[id number]] ordered by Sherry Jimenez   Sonohysterogram [[id number]] ordered by Sherry Jimenez   Endometrial biopsy [[id number]] ordered by Sherry Jimenez     Post-procedure Diagnoses   PMB (postmenopausal bleeding) [N95.0]            AMB US Pelvic Non OB     Date/Time: 5/28/2025 2:30 PM     Performed by: Sherry Jimenez  Authorized by: Roe Garces DO    Universal Protocol:  Consent: Verbal consent obtained  Consent given by: patient  Timeout called at: 5/28/2025 2:36 PM.  Patient understanding: patient  states understanding of the procedure being performed  Patient identity confirmed: verbally with patient     Procedure details:     SIS Procedure: Yes    Indications: non-obstetric vaginal bleeding      Technique:  Transvaginal US, Non-OB    Position: lithotomy exam    Uterine findings:     Length (cm): 7.64    Height (cm):  4.19    Width (cm):  6.05    Endometrial stripe: identified      Endometrium thickness (mm):  7.29  Left ovary findings:     Left ovary:  Visualized    Length (cm): 2.86    Height (cm): 1.49    Width (cm): 1.46  Right ovary findings:     Right ovary:  Visualized    Length (cm): 2.57    Height (cm): 1.38    Width (cm): 1.44  Other findings:     Free pelvic fluid: not identified      Free peritoneal fluid: not identified    Post-Procedure Details:     Impression:  Retroflexed uterus is inhomogeneous throughout without fibroids. The bilateral ovaries appear within normal limits. No free fluid.     Tolerance:  Tolerated well, no immediate complications    Complications: no complications    Additional Procedure Comments:      GE Voluson P8 transvaginal transducer RIC5-RA with Serial Number [id number] was used during procedure and subsequently cleaned with high level disinfection utilizing the [employer] EPR Probe .      Ultrasound performed at:      [facility]  [address] [place] [address]  Phone: [phone]  Fax:  [phone]        Sonohysterogram     Date/Time: 5/28/2025 2:30 PM     Performed by: Roe Garces DO  Authorized by: Roe Garces DO    Universal Protocol:  Consent: Verbal consent obtained  Consent given by: patient  Timeout called at: 5/28/2025 2:36 PM.  Patient understanding: patient states understanding of the procedure being performed  Patient consent: the patient's understanding of the procedure matches consent given  Patient identity confirmed: verbally with patient     Procedure:     Cervix cleaned  and prepped: yes      Tenaculum applied to cervix: yes      Uterus sounded: yes      Catheter inserted: yes      Uterine cavity distended with saline: yes    Post-procedure:     Patient observed: yes      Post procedure instructions given to patient: yes      Patient tolerated procedure well with no complications: yes    Comments:      Sonohysterogram demonstrates a smooth appearing endometrium.  Endometrium measuring 5 mm with saline  Endometrial biopsy     Date/Time: 5/28/2025 2:30 PM     Performed by: Roe Garces DO  Authorized by: Roe Garces DO    Universal Protocol:  Consent: Verbal consent obtained  Consent given by: patient  Timeout called at: 5/28/2025 2:36 PM.  Patient understanding: patient states understanding of the procedure being performed  Patient identity confirmed: verbally with patient     Indication:     Indications: Post-menopausal bleeding    Procedure:     Procedure: endometrial biopsy with Pipelle      A bivalve speculum was placed in the vagina: yes      Cervix cleaned and prepped: yes      Specimen collected: specimen collected and sent to pathology      Patient tolerated procedure well with no complications: yes

## 2025-06-02 PROCEDURE — 88305 TISSUE EXAM BY PATHOLOGIST: CPT | Performed by: STUDENT IN AN ORGANIZED HEALTH CARE EDUCATION/TRAINING PROGRAM

## 2025-06-05 ENCOUNTER — TELEPHONE (OUTPATIENT)
Age: 55
End: 2025-06-05

## 2025-06-05 NOTE — TELEPHONE ENCOUNTER
Patient scheduled for Monday appointment, needs labs put in system. Please call her they are in. Wants to go early am tomorrow.

## 2025-06-05 NOTE — TELEPHONE ENCOUNTER
First of all, is she taking synthroid brand or generic levothyroxine? 2nd, I assume she is still taking 150 mcg daily? We have no recent blood work, correct?

## 2025-06-05 NOTE — TELEPHONE ENCOUNTER
Patient called stating that she has been back and forth with OBGYN and ENDO to figure out why she has ongoing bleeding for years. Her OBGYN referred her back to ENDO as they are unsure if they should increase her doses of medication  because they are unsure if it is thyroid related and possibly need her synthroid adjusted. She said she has excessive weight gain, brain fog, and always tired. She has a f/u with Tra aTylor on 7/7, but would like some guidance and next steps before her appt. She also said her insurance never approved her increase in synthroid dose from 9/2024 so she has been on a lower dose of synthroid as well. Please advise.

## 2025-06-06 ENCOUNTER — RESULTS FOLLOW-UP (OUTPATIENT)
Dept: GYNECOLOGY | Facility: CLINIC | Age: 55
End: 2025-06-06

## 2025-06-06 ENCOUNTER — RESULTS FOLLOW-UP (OUTPATIENT)
Dept: ENDOCRINOLOGY | Facility: HOSPITAL | Age: 55
End: 2025-06-06

## 2025-06-06 ENCOUNTER — APPOINTMENT (OUTPATIENT)
Dept: LAB | Facility: HOSPITAL | Age: 55
End: 2025-06-06
Payer: COMMERCIAL

## 2025-06-06 DIAGNOSIS — E03.9 ACQUIRED HYPOTHYROIDISM: ICD-10-CM

## 2025-06-06 DIAGNOSIS — R73.03 PREDIABETES: ICD-10-CM

## 2025-06-06 DIAGNOSIS — Z78.0 MENOPAUSE: ICD-10-CM

## 2025-06-06 DIAGNOSIS — E03.9 ACQUIRED HYPOTHYROIDISM: Primary | ICD-10-CM

## 2025-06-06 PROBLEM — E06.3 HASHIMOTO'S THYROIDITIS: Status: ACTIVE | Noted: 2025-06-06

## 2025-06-06 LAB
ALBUMIN SERPL BCG-MCNC: 4.5 G/DL (ref 3.5–5)
ALP SERPL-CCNC: 62 U/L (ref 34–104)
ALT SERPL W P-5'-P-CCNC: 23 U/L (ref 7–52)
ANION GAP SERPL CALCULATED.3IONS-SCNC: 8 MMOL/L (ref 4–13)
AST SERPL W P-5'-P-CCNC: 21 U/L (ref 13–39)
BASOPHILS # BLD AUTO: 0.04 THOUSANDS/ÂΜL (ref 0–0.1)
BASOPHILS NFR BLD AUTO: 1 % (ref 0–1)
BILIRUB SERPL-MCNC: 0.69 MG/DL (ref 0.2–1)
BUN SERPL-MCNC: 22 MG/DL (ref 5–25)
CALCIUM SERPL-MCNC: 9.6 MG/DL (ref 8.4–10.2)
CHLORIDE SERPL-SCNC: 104 MMOL/L (ref 96–108)
CO2 SERPL-SCNC: 29 MMOL/L (ref 21–32)
CREAT SERPL-MCNC: 0.71 MG/DL (ref 0.6–1.3)
EOSINOPHIL # BLD AUTO: 0.28 THOUSAND/ÂΜL (ref 0–0.61)
EOSINOPHIL NFR BLD AUTO: 5 % (ref 0–6)
ERYTHROCYTE [DISTWIDTH] IN BLOOD BY AUTOMATED COUNT: 11.6 % (ref 11.6–15.1)
ESTRADIOL SERPL-MCNC: 38.5 PG/ML
FSH SERPL-ACNC: 18.9 MIU/ML
GFR SERPL CREATININE-BSD FRML MDRD: 96 ML/MIN/1.73SQ M
GLUCOSE P FAST SERPL-MCNC: 96 MG/DL (ref 65–99)
HCT VFR BLD AUTO: 44 % (ref 34.8–46.1)
HGB BLD-MCNC: 14.5 G/DL (ref 11.5–15.4)
IMM GRANULOCYTES # BLD AUTO: 0.01 THOUSAND/UL (ref 0–0.2)
IMM GRANULOCYTES NFR BLD AUTO: 0 % (ref 0–2)
LH SERPL-ACNC: 16.4 MIU/ML
LYMPHOCYTES # BLD AUTO: 1.53 THOUSANDS/ÂΜL (ref 0.6–4.47)
LYMPHOCYTES NFR BLD AUTO: 29 % (ref 14–44)
MCH RBC QN AUTO: 31.2 PG (ref 26.8–34.3)
MCHC RBC AUTO-ENTMCNC: 33 G/DL (ref 31.4–37.4)
MCV RBC AUTO: 95 FL (ref 82–98)
MONOCYTES # BLD AUTO: 0.46 THOUSAND/ÂΜL (ref 0.17–1.22)
MONOCYTES NFR BLD AUTO: 9 % (ref 4–12)
NEUTROPHILS # BLD AUTO: 2.9 THOUSANDS/ÂΜL (ref 1.85–7.62)
NEUTS SEG NFR BLD AUTO: 56 % (ref 43–75)
NRBC BLD AUTO-RTO: 0 /100 WBCS
PLATELET # BLD AUTO: 271 THOUSANDS/UL (ref 149–390)
PMV BLD AUTO: 10.4 FL (ref 8.9–12.7)
POTASSIUM SERPL-SCNC: 4.5 MMOL/L (ref 3.5–5.3)
PROT SERPL-MCNC: 7.1 G/DL (ref 6.4–8.4)
RBC # BLD AUTO: 4.65 MILLION/UL (ref 3.81–5.12)
SODIUM SERPL-SCNC: 141 MMOL/L (ref 135–147)
T3FREE SERPL-MCNC: 3.8 PG/ML (ref 2.5–3.9)
T4 FREE SERPL-MCNC: 1.33 NG/DL (ref 0.61–1.12)
TSH SERPL DL<=0.05 MIU/L-ACNC: 0.09 UIU/ML (ref 0.45–4.5)
WBC # BLD AUTO: 5.22 THOUSAND/UL (ref 4.31–10.16)

## 2025-06-06 PROCEDURE — 84443 ASSAY THYROID STIM HORMONE: CPT

## 2025-06-06 PROCEDURE — 83001 ASSAY OF GONADOTROPIN (FSH): CPT

## 2025-06-06 PROCEDURE — 83002 ASSAY OF GONADOTROPIN (LH): CPT

## 2025-06-06 PROCEDURE — 82670 ASSAY OF TOTAL ESTRADIOL: CPT

## 2025-06-06 PROCEDURE — 84481 FREE ASSAY (FT-3): CPT | Performed by: INTERNAL MEDICINE

## 2025-06-06 PROCEDURE — 36415 COLL VENOUS BLD VENIPUNCTURE: CPT

## 2025-06-06 PROCEDURE — 85025 COMPLETE CBC W/AUTO DIFF WBC: CPT | Performed by: INTERNAL MEDICINE

## 2025-06-06 PROCEDURE — 80053 COMPREHEN METABOLIC PANEL: CPT

## 2025-06-06 PROCEDURE — 84439 ASSAY OF FREE THYROXINE: CPT

## 2025-06-06 NOTE — TELEPHONE ENCOUNTER
Patient called again she has a schedule  appointment for Monday  she would  like lab orders  to please  be put in her  chart. She would  like a phone call back please.

## 2025-06-06 NOTE — TELEPHONE ENCOUNTER
She is going for the blood work this morning and has an appointment Monday with Jason. She said she is taking BRAND 150 mcg daily

## 2025-06-07 DIAGNOSIS — Z78.0 MENOPAUSE: ICD-10-CM

## 2025-06-09 ENCOUNTER — OFFICE VISIT (OUTPATIENT)
Dept: ENDOCRINOLOGY | Facility: HOSPITAL | Age: 55
End: 2025-06-09
Payer: COMMERCIAL

## 2025-06-09 VITALS
HEIGHT: 64 IN | DIASTOLIC BLOOD PRESSURE: 74 MMHG | SYSTOLIC BLOOD PRESSURE: 122 MMHG | WEIGHT: 210 LBS | BODY MASS INDEX: 35.85 KG/M2 | OXYGEN SATURATION: 99 % | HEART RATE: 70 BPM

## 2025-06-09 DIAGNOSIS — E03.9 ACQUIRED HYPOTHYROIDISM: Primary | ICD-10-CM

## 2025-06-09 DIAGNOSIS — E66.812 CLASS 2 OBESITY WITHOUT SERIOUS COMORBIDITY WITH BODY MASS INDEX (BMI) OF 35.0 TO 35.9 IN ADULT, UNSPECIFIED OBESITY TYPE: ICD-10-CM

## 2025-06-09 PROCEDURE — 99214 OFFICE O/P EST MOD 30 MIN: CPT | Performed by: PHYSICIAN ASSISTANT

## 2025-06-09 RX ORDER — LEVOTHYROXINE SODIUM 150 MCG
1 TABLET ORAL DAILY
COMMUNITY
Start: 2025-04-12 | End: 2025-06-09

## 2025-06-09 RX ORDER — LEVOTHYROXINE SODIUM 150 MCG
TABLET ORAL
Qty: 90 TABLET | Refills: 1 | Status: SHIPPED | OUTPATIENT
Start: 2025-06-09

## 2025-06-09 RX ORDER — BUSPIRONE HYDROCHLORIDE 15 MG/1
15 TABLET ORAL 2 TIMES DAILY
COMMUNITY
Start: 2025-05-23

## 2025-06-09 RX ORDER — DEXTROAMPHETAMINE SACCHARATE, AMPHETAMINE ASPARTATE MONOHYDRATE, DEXTROAMPHETAMINE SULFATE AND AMPHETAMINE SULFATE 5; 5; 5; 5 MG/1; MG/1; MG/1; MG/1
CAPSULE, EXTENDED RELEASE ORAL
COMMUNITY
Start: 2025-05-29

## 2025-06-09 RX ORDER — LEVOTHYROXINE SODIUM 175 MCG
175 TABLET ORAL DAILY
Qty: 90 TABLET | Refills: 1 | Status: SHIPPED | OUTPATIENT
Start: 2025-06-09 | End: 2025-06-09

## 2025-06-09 RX ORDER — ESTRADIOL 0.07 MG/D
FILM, EXTENDED RELEASE TRANSDERMAL
Qty: 8 PATCH | Refills: 0 | Status: SHIPPED | OUTPATIENT
Start: 2025-06-09 | End: 2025-06-13

## 2025-06-09 NOTE — ASSESSMENT & PLAN NOTE
Continues to have concerns with her weight.  Initial endocrine evaluation did not show any abnormalities besides her thyroid.  At this time her thyroid is likely in not the cause of her weight gain.  If getting her thyroid levels and check along with things doing well with HRT and there is no weight improvement would recommend following up with weight management.

## 2025-06-09 NOTE — ASSESSMENT & PLAN NOTE
Reviewed recent set of thyroid lab work.  TSH was low with an elevated free T4 and a high end of normal free T3.  This could be possibly due to starting HRT.  Review of her previous thyroid lab work does show that she fluctuates quite a bit and was the initial reason why she was switched from generic levothyroxine to Synthroid.  It may be worth considering switching her to Tirosint in the future.  In the meantime we will decrease her Synthroid to 1 tablet 6 days a week and 1/2 tablet on Sunday.  Would like to see thyroid lab work in 6 weeks with a follow-up in 3 months.  Stressed the importance of routinely following up and getting lab work so we can trend how things are going and make the appropriate adjustments.  Hoping that the adjustment will help with some of her symptoms.    She also mentioned that office visit that her OB/GYN wanted us to manage her HRT.  I did not see that in his note, and I will reach out to him to get his input on the specific situation.  I will also let Dr. Griffin know about patient's concerns.  Orders:  •  TSH, 3rd generation; Future  •  T4, free; Future  •  T3, free; Future  •  TSH, 3rd generation; Future  •  T4, free; Future  •  T3, free; Future

## 2025-06-09 NOTE — PROGRESS NOTES
Name: Joselin Weiss      : 1970      MRN: [id number]  Encounter Provider: Tra Taylor PA-C  Encounter Date: 2025   Encounter department: [facility]    No chief complaint on file.  :  Assessment & Plan  Acquired hypothyroidism  Reviewed recent set of thyroid lab work.  TSH was low with an elevated free T4 and a high end of normal free T3.  This could be possibly due to starting HRT.  Review of her previous thyroid lab work does show that she fluctuates quite a bit and was the initial reason why she was switched from generic levothyroxine to Synthroid.  It may be worth considering switching her to Tirosint in the future.  In the meantime we will decrease her Synthroid to 1 tablet 6 days a week and 1/2 tablet on .  Would like to see thyroid lab work in 6 weeks with a follow-up in 3 months.  Stressed the importance of routinely following up and getting lab work so we can trend how things are going and make the appropriate adjustments.  Hoping that the adjustment will help with some of her symptoms.    She also mentioned that office visit that her OB/GYN wanted us to manage her HRT.  I did not see that in his note, and I will reach out to him to get his input on the specific situation.  I will also let Dr. Griffin know about patient's concerns.  Orders:  •  TSH, 3rd generation; Future  •  T4, free; Future  •  T3, free; Future  •  TSH, 3rd generation; Future  •  T4, free; Future  •  T3, free; Future    Class 2 obesity without serious comorbidity with body mass index (BMI) of 35.0 to 35.9 in adult, unspecified obesity type  Continues to have concerns with her weight.  Initial endocrine evaluation did not show any abnormalities besides her thyroid.  At this time her thyroid is likely in not the cause of her weight gain.  If getting her thyroid levels and check along with things doing well with HRT and there is no weight improvement would  recommend following up with weight management.           History of Present Illness     Joselin Weiss is a 55 y.o. female here for evaluation and consultation regarding her hypothyroidism.     She was diagnosed with hypothyroidism 21 years ago, following the birth of her first son. At that time, she also had high cholesterol, which was unusual for her. She has been on thyroid medication since then, with the dosage being adjusted over the years.  Initially on generic levothyroxine, but was switched to brand-name Synthroid due to levels fluctuating.  Currently, she is taking Synthroid 150 mcg once daily in the morning, along with an over-the-counter multivitamin.  At this time her biggest concerns are excessive weight gain.  According to our scale she does weigh 3 pounds less than last office visit in September 2024, so weight is relatively stable at this time.  She also has increasing fatigue, and brain fog.  She is also having irregular menses which she is following up with OB/GYN and is currently on estradiol 0.075 milligram patches twice a week, and progesterone 20 mg daily.   She also has constipation which has been going on since onset of her hypothyroidism.  Is also experiencing hair loss and dry skin.  She does not report any double vision or difficulty swallowing. She has not undergone radiation treatment to her head or neck.      She gained significant weight during her pregnancies, reaching a peak weight of 238 pounds. She managed to lose some weight, reaching a low of 190 pounds at her wedding, but has since regained some weight. She maintains a diet high in protein and low in carbohydrates and exercises 3-4 times a week. She has tried various weight management drugs, including Qsymia, phentermine, Topamax, and semaglutide, but found them either ineffective or associated with side effects such as anxiety and confusion.  Semaglutide was too expensive to continue and she only managed to lose 12  "pounds.     She has been diagnosed with prediabetes, which was confirmed by recent blood work. She has previously been diagnosed with prediabetes, but it resolved on its own.    Review of Systems   Constitutional:  Positive for fatigue and unexpected weight change. Negative for activity change, appetite change and diaphoresis.   HENT:  Negative for sore throat, trouble swallowing and voice change.    Eyes:  Negative for visual disturbance.   Respiratory:  Negative for chest tightness and shortness of breath.    Cardiovascular:  Negative for chest pain, palpitations and leg swelling.   Gastrointestinal:  Positive for constipation. Negative for abdominal pain and diarrhea.   Endocrine: Negative for cold intolerance, heat intolerance, polydipsia, polyphagia and polyuria.   Genitourinary:  Positive for menstrual problem.   Skin:  Negative for rash.        Hair loss and dry skin   Neurological:  Negative for dizziness, tremors, light-headedness, numbness and headaches.   Hematological:  Negative for adenopathy.   Psychiatric/Behavioral:  Negative for dysphoric mood and sleep disturbance. The patient is not nervous/anxious.     as per HPI  Medical History Reviewed by provider this encounter:     .  Medications Ordered Prior to Encounter[1]   Social History[2]     Medical History Reviewed by provider this encounter:     .    Objective   /74   Pulse 70   Ht 5' 4\" (1.626 m)   Wt 95.3 kg (210 lb)   SpO2 99%   BMI 36.05 kg/m²      Body mass index is 36.05 kg/m².  Wt Readings from Last 3 Encounters:   06/09/25 95.3 kg (210 lb)   09/13/24 96.2 kg (212 lb)   09/06/24 97 kg (213 lb 12.8 oz)     Physical Exam  Vitals and nursing note reviewed.   Constitutional:       General: She is not in acute distress.     Appearance: Normal appearance. She is well-developed. She is not diaphoretic.     Eyes:      General: No scleral icterus.     Extraocular Movements: Extraocular movements intact.      Conjunctiva/sclera: " "Conjunctivae normal.      Pupils: Pupils are equal, round, and reactive to light.       Cardiovascular:      Rate and Rhythm: Normal rate and regular rhythm.      Pulses: Normal pulses.      Heart sounds: Normal heart sounds. No murmur heard.     No friction rub. No gallop.   Pulmonary:      Effort: Pulmonary effort is normal. No tachypnea, bradypnea or respiratory distress.      Breath sounds: Normal breath sounds. No wheezing.     Musculoskeletal:      Cervical back: Normal range of motion.      Right lower leg: No edema.      Left lower leg: No edema.   Lymphadenopathy:      Cervical: No cervical adenopathy.     Skin:     General: Skin is warm and dry.     Neurological:      Mental Status: She is alert and oriented to person, place, and time. Mental status is at baseline. She is not disoriented.      Motor: No abnormal muscle tone.      Gait: Gait normal.      Deep Tendon Reflexes: Reflexes are normal and symmetric.     Psychiatric:         Mood and Affect: Mood normal.         Behavior: Behavior normal.         Thought Content: Thought content normal.         Labs: I have reviewed pertinent labs including:   Lab Results   Component Value Date    HGBA1C 5.7 (H) 09/05/2024      Lab Results   Component Value Date    CREATININE 0.71 06/06/2025    CREATININE 0.88 09/05/2024    BUN 22 06/06/2025    K 4.5 06/06/2025     06/06/2025    CO2 29 06/06/2025      eGFR   Date Value Ref Range Status   06/06/2025 96 ml/min/1.73sq m Final      No results found for: \"CHOL\", \"HDL\", \"LDL\", \"TRIG\", \"CHOLHDL\"   ALT   Date Value Ref Range Status   06/06/2025 23 7 - 52 U/L Final     Comment:     Specimen collection should occur prior to Sulfasalazine administration due to the potential for falsely depressed results.      AST   Date Value Ref Range Status   06/06/2025 21 13 - 39 U/L Final     Alkaline Phosphatase   Date Value Ref Range Status   06/06/2025 62 34 - 104 U/L Final      Lab Results   Component Value Date    DRI5ZNYOLBRZ " 0.086 (L) 06/06/2025      Lab Results   Component Value Date    FREET4 1.33 (H) 06/06/2025    T3FREE 3.80 06/06/2025      Radiology Results Review : No pertinent imaging studies reviewed.  Patient Instructions   Decrease Synthroid to 150 mcg 6 days a week and half a tab on Sunday.    Get lab work at 6 weeks to see if adjustment needs to be made.    Will talk to Dr. Griffin and Dr. Garces to devise a plan.    Call with any concerns or questions.    Follow up in 3 months with labs.    Discussed with the patient and all questioned fully answered. She will call me if any problems arise.           [1]  Current Outpatient Medications on File Prior to Visit   Medication Sig Dispense Refill   • amphetamine-dextroamphetamine (ADDERALL XR) 20 MG 24 hr capsule Take by mouth     • busPIRone (BUSPAR) 15 mg tablet Take 15 mg by mouth 2 (two) times a day     • estradiol (VIVELLE-DOT) 0.075 MG/24HR PLACE 1 PATCH ON THE SKIN 2 TIMES A WEEK. 8 patch 0   • Multiple Vitamins-Minerals (Multivitamin Gummies Womens) CHEW Chew 3 daily     • Progesterone 200 MG CAPS Take 200 mg by mouth daily 30 capsule 0   • simvastatin (ZOCOR) 20 mg tablet Take 1 tablet by mouth every evening     • [DISCONTINUED] Synthroid 150 MCG tablet Take 1 tablet by mouth in the morning     • [DISCONTINUED] amphetamine-dextroamphetamine (ADDERALL) 20 mg tablet Take 20 mg by mouth 2 (two) times a day     • [DISCONTINUED] COLLAGEN PO Take by mouth Liquid prn     • [DISCONTINUED] estradiol (VIVELLE-DOT) 0.075 MG/24HR Place 1 patch on the skin 2 (two) times a week 8 patch 0   • [DISCONTINUED] halobetasol (ULTRAVATE) 0.05 % ointment Apply 1 application. topically 2 (two) times a day     • [DISCONTINUED] LORazepam (ATIVAN) 0.5 mg tablet Take 0.5 mg by mouth 1 in am prn, rarely 2nd in a day     • [DISCONTINUED] methylPREDNISolone 4 MG tablet therapy pack Use as directed on package 21 tablet 0   • [DISCONTINUED] MISC NATURAL PRODUCTS PO Take by mouth Isagenix isaflush(  for consipation) prn     • [DISCONTINUED] Synthroid 175 MCG tablet Take 1 tablet (175 mcg total) by mouth daily 90 tablet 1     No current facility-administered medications on file prior to visit.   [2]  Social History  Tobacco Use   • Smoking status: Never     Passive exposure: Never   • Smokeless tobacco: Never   Vaping Use   • Vaping status: Never Used   Substance and Sexual Activity   • Alcohol use: Yes     Alcohol/week: 4.0 standard drinks of alcohol     Types: 4 Glasses of wine per week     Comment: social   • Drug use: Never

## 2025-06-09 NOTE — PATIENT INSTRUCTIONS
Decrease Synthroid to 150 mcg 6 days a week and half a tab on Sunday.    Get lab work at 6 weeks to see if adjustment needs to be made.    Will talk to Dr. Griffin and Dr. Garces to devise a plan.    Call with any concerns or questions.    Follow up in 3 months with labs.

## 2025-06-13 DIAGNOSIS — Z78.0 MENOPAUSE: ICD-10-CM

## 2025-06-13 RX ORDER — ESTRADIOL 0.07 MG/D
FILM, EXTENDED RELEASE TRANSDERMAL
Qty: 8 PATCH | Refills: 0 | Status: SHIPPED | OUTPATIENT
Start: 2025-06-13

## 2025-06-16 RX ORDER — PROGESTERONE 200 MG/1
1 CAPSULE ORAL DAILY
Qty: 30 CAPSULE | Refills: 0 | Status: SHIPPED | OUTPATIENT
Start: 2025-06-16

## 2025-06-16 NOTE — TELEPHONE ENCOUNTER
Patient called regarding Progesterone RX.  Patient informed RX called in the this morning to [facility] pharmacy

## 2025-07-13 DIAGNOSIS — Z78.0 MENOPAUSE: ICD-10-CM

## 2025-07-14 DIAGNOSIS — Z78.0 MENOPAUSE: ICD-10-CM

## 2025-07-14 RX ORDER — PROGESTERONE 200 MG/1
1 CAPSULE ORAL DAILY
Qty: 30 CAPSULE | Refills: 0 | Status: SHIPPED | OUTPATIENT
Start: 2025-07-14

## 2025-07-14 RX ORDER — PROGESTERONE 200 MG/1
1 CAPSULE ORAL DAILY
Qty: 30 CAPSULE | Refills: 0 | OUTPATIENT
Start: 2025-07-14

## 2025-07-14 NOTE — TELEPHONE ENCOUNTER
Pt called in requesting refill of medication. Requesting multiple refills to avoid having to call in every month.    Medication: Progesterone 200 MG CAPS     Dose/Frequency: TAKE 1 CAPSULE BY MOUTH EVERY DAY     Quantity: 30 capsule     Pharmacy: [id number] - [place] - [address] [phone]     Office:   [] PCP/Provider -   [x] Speciality/Provider -     Does the patient have enough for 3 days?   [] Yes   [x] No - Send as HP to POD

## 2025-07-15 RX ORDER — ESTRADIOL 0.07 MG/D
FILM, EXTENDED RELEASE TRANSDERMAL
Qty: 8 PATCH | Refills: 0 | Status: SHIPPED | OUTPATIENT
Start: 2025-07-15